# Patient Record
Sex: MALE | Race: OTHER | URBAN - METROPOLITAN AREA
[De-identification: names, ages, dates, MRNs, and addresses within clinical notes are randomized per-mention and may not be internally consistent; named-entity substitution may affect disease eponyms.]

---
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
## 2024-05-16 ENCOUNTER — APPOINTMENT (OUTPATIENT)
Dept: ORTHOPEDIC SURGERY | Facility: CLINIC | Age: 19
End: 2024-05-16
Payer: COMMERCIAL

## 2024-05-16 DIAGNOSIS — M76.61 ACHILLES TENDINITIS, RIGHT LEG: ICD-10-CM

## 2024-05-16 PROCEDURE — 99204 OFFICE O/P NEW MOD 45 MIN: CPT

## 2024-05-16 NOTE — PHYSICAL EXAM
[Right] : right foot and ankle [NL (20)] : dorsiflexion 20 degrees [NL (40)] : plantar flexion 40 degrees [5___] : plantar flexion 5[unfilled]/5 [2+] : dorsalis pedis pulse: 2+ [] : patient ambulates without assistive device

## 2024-05-16 NOTE — HISTORY OF PRESENT ILLNESS
[7] : 7 [Sharp] : sharp [de-identified] : 5/16/24 Patient is a 18 year old male ( [employer] [facility]) here for his right achilles. States he has been having pain in the achilles since 2/2024. He denies injury. States he has been seeing the school doctors and had an MRI done 2 weeks ago, which showed achilles tendonitis. He has been attending PT through the trainers at school. He has pain every step. There is start up pain.  He takes tylenol as needed.  [] : no [FreeTextEntry1] : right achilles

## 2024-05-16 NOTE — DATA REVIEWED
[MRI] : MRI [Right] : of the right [Ankle] : ankle [I independently reviewed and interpreted images and report] : I independently reviewed and interpreted images and report [I reviewed the films/CD and agree] : I reviewed the films/CD and agree [FreeTextEntry1] : 5/3/24: Mild to moderate tendinosis of the distal achilles tendon, Os trigonum

## 2024-05-16 NOTE — DISCUSSION/SUMMARY
[de-identified] : Discussed PT Can consider PRP Home exercises Would not recommend surgery at this point

## 2024-05-24 ENCOUNTER — APPOINTMENT (OUTPATIENT)
Dept: ORTHOPEDIC SURGERY | Facility: CLINIC | Age: 19
End: 2024-05-24

## 2025-02-20 ENCOUNTER — APPOINTMENT (OUTPATIENT)
Dept: ORTHOPEDIC SURGERY | Facility: CLINIC | Age: 20
End: 2025-02-20

## 2025-02-20 VITALS — BODY MASS INDEX: 33.13 KG/M2 | WEIGHT: 250 LBS | HEIGHT: 73 IN

## 2025-02-20 DIAGNOSIS — Z78.9 OTHER SPECIFIED HEALTH STATUS: ICD-10-CM

## 2025-02-20 DIAGNOSIS — M25.512 PAIN IN LEFT SHOULDER: ICD-10-CM

## 2025-02-20 DIAGNOSIS — S43.52XA SPRAIN OF LEFT ACROMIOCLAVICULAR JOINT, INITIAL ENCOUNTER: ICD-10-CM

## 2025-02-20 PROCEDURE — 73030 X-RAY EXAM OF SHOULDER: CPT | Mod: LT

## 2025-02-20 PROCEDURE — 73010 X-RAY EXAM OF SHOULDER BLADE: CPT | Mod: LT

## 2025-02-20 PROCEDURE — 99214 OFFICE O/P EST MOD 30 MIN: CPT

## 2025-02-20 PROCEDURE — 73050 X-RAY EXAM OF SHOULDERS: CPT
